# Patient Record
Sex: MALE | Race: WHITE | NOT HISPANIC OR LATINO | Employment: FULL TIME | URBAN - NONMETROPOLITAN AREA
[De-identification: names, ages, dates, MRNs, and addresses within clinical notes are randomized per-mention and may not be internally consistent; named-entity substitution may affect disease eponyms.]

---

## 2023-07-18 LAB — SPERM COUNT, POST_OP -DATA CONVERSION: NORMAL /HPF

## 2024-03-15 ENCOUNTER — OFFICE VISIT (OUTPATIENT)
Dept: URGENT CARE | Facility: CLINIC | Age: 29
End: 2024-03-15
Payer: COMMERCIAL

## 2024-03-15 VITALS
HEART RATE: 89 BPM | HEIGHT: 72 IN | RESPIRATION RATE: 16 BRPM | TEMPERATURE: 98.4 F | BODY MASS INDEX: 25.73 KG/M2 | SYSTOLIC BLOOD PRESSURE: 109 MMHG | OXYGEN SATURATION: 96 % | WEIGHT: 190 LBS | DIASTOLIC BLOOD PRESSURE: 77 MMHG

## 2024-03-15 DIAGNOSIS — K04.7 DENTAL INFECTION: Primary | ICD-10-CM

## 2024-03-15 PROCEDURE — 99213 OFFICE O/P EST LOW 20 MIN: CPT | Performed by: NURSE PRACTITIONER

## 2024-03-15 RX ORDER — NAPROXEN 500 MG/1
500 TABLET ORAL EVERY 12 HOURS PRN
Qty: 20 TABLET | Refills: 0 | Status: SHIPPED | OUTPATIENT
Start: 2024-03-15

## 2024-03-15 RX ORDER — CLINDAMYCIN HYDROCHLORIDE 300 MG/1
300 CAPSULE ORAL 3 TIMES DAILY
Qty: 21 CAPSULE | Refills: 0 | Status: SHIPPED | OUTPATIENT
Start: 2024-03-15 | End: 2024-03-22

## 2024-03-15 NOTE — PROGRESS NOTES
[facility]  Sol Le, APRN-CNP     Visit Note - 3/15/2024 2:40 PM   This note was generated with voice recognition software and may contain errors including spelling, grammar, syntax, and misrecognization of what was dictated.    Patient: Joe Chapman, MRN: [id number], 28 y.o., male   PCP: No primary care provider on file.  ------------------------------------  ALLERGIES:   Allergies   Allergen Reactions    Amoxicillin Hives        CURRENT MEDICATIONS:   Current Outpatient Medications   Medication Instructions    clindamycin (CLEOCIN) 300 mg, oral, 3 times daily    naproxen (NAPROSYN) 500 mg, oral, Every 12 hours PRN, Take with a meal.     ------------------------------------  PAST MEDICAL HX:  No known health issues.   SURGICAL HX:  History reviewed. No pertinent surgical history.   FAMILY HX:   No pertinent history.   SOCIAL HX:   Vapes. Currently employed.   ------------------------------------  CHIEF COMPLAINT:   Chief Complaint   Patient presents with    Dental Pain     LEFT SIDE TOP AND BOTTOM DENTAL PAIN X 2 DAYS      HISTORY OF PRESENT ILLNESS: The history was obtained from patient. Joe is a 28 y.o. male, who presents with a chief complaint of dental pain that started on Wednesday AM. Pain scale currently 9/10 at times - describes pain as throbbing and sharp. Reports jaw seems a little swollen, and has had some chills. Denies any fevers, body aches, changes in mental status, malaise, fatigue, n/v, sinus congestion/pain, sore throat, headaches, or other symptoms. Has been eating and drinking normally, but complains of pain with eating and drinking. Reports has a history of dental infection; called his dentist but they were unable to see him this week.. Has been taking extra strength pain reliever and doing cold water gargles without much relief. Currently vapes.    REVIEW OF SYSTEMS:  10 systems reviewed negative with exception of history of present illness as listed  above.    TODAY'S VITALS: /77   Pulse 89   Temp 36.9 °C (98.4 °F)   Resp 16   Ht 1.829 m (6')   Wt 86.2 kg (190 lb)   SpO2 96%   BMI 25.77 kg/m²     PHYSICAL EXAMINATION:  General: Pleasant male, alert and oriented, in no acute distress. Sitting comfortably on exam table.   Eyes:  Pupils are equal, round and reactive. No conjunctival erythema or exudate to eyes noted.   HENT: Normocephalic; TMs and ear canals bilat clear. Nasal mucosa unremarkable; no audible nasal congestion or sinus tenderness, no maxillary or facial edema. Mucous membranes moist. No oral lesions; posterior pharynx unremarkable. Able to swallow/manage oral secretions without difficulty. Poor dentition, with several broken teeth with severe dental caries. Gumline above broken/severely carried tooth (L side, lower  jaw, #19 and #20) slightly swollen and erythematous, and exquisitely tender to palpation. No fluctuance or visible pointing to the area. No active drainage.  Neck:  Supple; Tender, mobile high anterior cervical lymphadenopathy bilat.   Respiratory:  Respirations are easy and non-labored, with normal rate. Symmetrical chest wall expansion. Lungs are clear to auscultation - no wheezing, rhonchi, or rales.   Cardiovascular:  Normal rate, Regular rhythm. Normal S1S2. No m/r/g.   Integumentary:  Pink, warm, dry, and Intact. Normal skin turgor; no rashes appreciated. Good pulses/perfusion.  Neurologic:  Alert, Oriented, Sensory and motor function grossly intact for age.   Cognition and Speech: Oriented; Speech clear and coherent  Psychiatric:  Cooperative, Appropriate mood & affect.     ------------------------------------  Medical Decision Making  LABORATORY or RADIOLOGICAL IMAGING ORDERS/RESULTS:   None.    IMPRESSION/PLAN:  Course: Worsening; stable    1. Dental infection  - clindamycin (Cleocin) 300 mg capsule; Take 1 capsule (300 mg) by mouth 3 times a day for 7 days.  Dispense: 21 capsule; Refill: 0  - naproxen (Naprosyn) 500  mg tablet; Take 1 tablet (500 mg) by mouth every 12 hours if needed (pain/inflammation). Take with a meal.  Dispense: 20 tablet; Refill: 0    No red flags on exam, but will need close monitoring; No evidence of drainable abscess or of Amado Angina at this time. Will begin antibiotic treatment for dental infection today (Clindamycin, as he has PN allergy), but stressed importance of following up with dental clinic, as without intervention, infection can continue to reoccur. Urged to complete full course of treatment, even if symptoms resolve more quickly. Encouraged to  rest, to push fluids, and will start Naproxen PRN for discomfort; can use Tylenol as adjunct. Warm salt water gargles and cool compresses may also be helpful. Reviewed red flags to monitor for, expectations for resolution of infection, instructions and common side effects of treatment, and encouraged to RTC or see PCP/dentist if symptoms not improving over the next 24-48 hours, or to seek care sooner if sxs worsen/any red flags develop. Should also contact dentist ASAP to let them know that antibiotic treatment was started, and to make plan for f/u. Patient seems satisfied and agrees with plan of care; questions were encouraged and answered.         oSl Le, APRN-CNP   Advanced Practice Provider  [facility]

## 2024-03-15 NOTE — LETTER
March 15, 2024     Patient: Joe Chapman   YOB: 1995   Date of Visit: 3/15/2024       To Whom It May Concern:    Joe Chapman was seen at [facility] on 3/15/2024. Please excuse Joe from work/school beginning now and through: 3/16/24.       Sincerely,         Sol Le, SAUL-CNP